# Patient Record
Sex: MALE | Race: NATIVE HAWAIIAN OR OTHER PACIFIC ISLANDER | HISPANIC OR LATINO | Employment: UNEMPLOYED | URBAN - METROPOLITAN AREA
[De-identification: names, ages, dates, MRNs, and addresses within clinical notes are randomized per-mention and may not be internally consistent; named-entity substitution may affect disease eponyms.]

---

## 2024-05-04 ENCOUNTER — APPOINTMENT (OUTPATIENT)
Dept: GENERAL RADIOLOGY | Facility: CLINIC | Age: 29
End: 2024-05-04
Attending: EMERGENCY MEDICINE

## 2024-05-04 ENCOUNTER — HOSPITAL ENCOUNTER (EMERGENCY)
Facility: CLINIC | Age: 29
Discharge: HOME | End: 2024-05-04
Attending: EMERGENCY MEDICINE | Admitting: EMERGENCY MEDICINE

## 2024-05-04 VITALS
HEART RATE: 62 BPM | DIASTOLIC BLOOD PRESSURE: 74 MMHG | TEMPERATURE: 98.4 F | RESPIRATION RATE: 18 BRPM | SYSTOLIC BLOOD PRESSURE: 114 MMHG | OXYGEN SATURATION: 95 %

## 2024-05-04 DIAGNOSIS — S86.011A ACHILLES TENDON TEAR, RIGHT, INITIAL ENCOUNTER: Primary | ICD-10-CM

## 2024-05-04 PROCEDURE — 99284 EMERGENCY DEPT VISIT MOD MDM: CPT | Mod: 25 | Performed by: EMERGENCY MEDICINE

## 2024-05-04 PROCEDURE — 29515 APPLICATION SHORT LEG SPLINT: CPT | Mod: RT | Performed by: EMERGENCY MEDICINE

## 2024-05-04 PROCEDURE — 73610 X-RAY EXAM OF ANKLE: CPT | Mod: RT

## 2024-05-04 PROCEDURE — 99284 EMERGENCY DEPT VISIT MOD MDM: CPT | Performed by: EMERGENCY MEDICINE

## 2024-05-04 RX ORDER — PROPARACAINE HYDROCHLORIDE 5 MG/ML
1 SOLUTION/ DROPS OPHTHALMIC ONCE
Status: COMPLETED | OUTPATIENT
Start: 2024-05-04 | End: 2024-05-04

## 2024-05-04 RX ORDER — BACITRACIN ZINC 500 [USP'U]/G
OINTMENT TOPICAL DAILY
Status: DISCONTINUED | OUTPATIENT
Start: 2024-05-04 | End: 2024-05-04

## 2024-05-04 RX ORDER — BACITRACIN ZINC 500 [USP'U]/G
OINTMENT TOPICAL 2 TIMES DAILY
Qty: 30 G | Refills: 0 | Status: SHIPPED | OUTPATIENT
Start: 2024-05-04 | End: 2024-05-11

## 2024-05-04 RX ORDER — BACITRACIN ZINC 500 [USP'U]/G
OINTMENT TOPICAL ONCE
Status: COMPLETED | OUTPATIENT
Start: 2024-05-04 | End: 2024-05-04

## 2024-05-04 ASSESSMENT — ACTIVITIES OF DAILY LIVING (ADL)
ADLS_ACUITY_SCORE: 35

## 2024-05-04 ASSESSMENT — COLUMBIA-SUICIDE SEVERITY RATING SCALE - C-SSRS
6. HAVE YOU EVER DONE ANYTHING, STARTED TO DO ANYTHING, OR PREPARED TO DO ANYTHING TO END YOUR LIFE?: NO
2. HAVE YOU ACTUALLY HAD ANY THOUGHTS OF KILLING YOURSELF IN THE PAST MONTH?: NO
1. IN THE PAST MONTH, HAVE YOU WISHED YOU WERE DEAD OR WISHED YOU COULD GO TO SLEEP AND NOT WAKE UP?: NO
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
## 2024-05-04 NOTE — ED TRIAGE NOTES
Triage Assessment (Adult)       Row Name 05/04/24 0112          Triage Assessment    Airway WDL WDL        Respiratory WDL    Respiratory WDL WDL        Skin Circulation/Temperature WDL    Skin Circulation/Temperature WDL WDL        Cardiac WDL    Cardiac WDL WDL        Peripheral/Neurovascular WDL    Peripheral Neurovascular WDL WDL        Cognitive/Neuro/Behavioral WDL    Cognitive/Neuro/Behavioral WDL WDL
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
## 2024-05-10 NOTE — PROGRESS NOTES
Dionicio Houston  :  1995  DOS: 5/10/2024  MRN: [id number]  PCP: No Ref-Primary, Physician    Sports Medicine Clinic Visit    HPI  Dionicio Houston is a 28 year old male who is seen as an ER referral presenting with possible right partial achilles tear.    - Mechanism of Injury:    -  Jumping over a ledge and landing on it.  [place] like he sprained the right ankle.  Difficulty bearing weight after and most pain when the foot was flat.  - Pertinent history and prior evaluations:    - was seen in the ED on 24 .  A defect was felt in the right Achilles tendon with a positive Johnson's test and tenderness.  X-ray showed no acute fracture or dislocation, but did identify a possible intraosseous lipoma in the calcaneus.   - Placed in walking boot and made nonweightbearing with crutches.  Orthopedic follow-up    - Pain Character:    - Location:  Right achilles  - Character:  Pain sometimes  - Duration:  May 4th  - Course:  Wearing the boot since the emergency room  - Endorses:    -  Pain and swelling  - Denies:    - numbness, tingling  - Alleviating factors:    -  Wearing the boot full time    - Aggravating factors:    - weight bearing, walking out of the boot  - Other treatments tried:    -  nothing    - Patient Goals:    - discuss treatment options  - Social History:   - Employed:  [employer]       Review of Systems  Musculoskeletal: as above  Remainder of review of systems is negative including constitutional, CV, pulmonary, GI, Skin and Neurologic except as noted in HPI or medical history.    No past medical history on file.  No past surgical history on file.  No family history on file.      Objective  There were no vitals taken for this visit.    General: healthy, alert and in no acute distress.    HEENT: no scleral icterus or conjunctival erythema.   Skin: no suspicious lesions or rash. No jaundice.   CV: regular rhythm by palpation, 2+ distal pulses.  Resp: normal respiratory effort  without conversational dyspnea.   Psych: normal mood and affect.    Gait: antalgic favoring the right leg, walking boot in place with appropriate coordination and balance.    Neuro:       - Sensation to light touch:  Intact throughout the BLE including all peripheral nerve distributions.     MSK - Ankle/Foot:       - Inspection/palpation:    -There is an obvious defect to the right Achilles compared to the left.  Palpable gap in the Achilles tendon no significant swelling, erythema, ecchymosis, lesion.        - ROM:    - Full and painless AROM/PROM.        - Strength:  (*antalgic)   - Knee Flexion  5   - Knee Extension 5   - Dorsiflexion  5   - Plantarflexion 2        - Special tests:        - Anterior drawer:  Neg   - Talar tilt:  Neg   - Syndesmotic squeeze: Neg   - Kleiger:  Neg   - Rebekah:  Neg   - Johnson: Positive   - Metatarsal squeeze:  Neg       - Functional tests:        - Heel raises: Unable to perform a single wrap due to weakness, not pain      Radiology  I independently reviewed the available relevant imaging in the chart, with my interpretations as above in HPI.    I independently reviewed today's new relevant imaging, with the following interpretation:  - Limited point-of-care ultrasound demonstrated near complete tearing of the Achilles tendon with associated gapped fibers and surrounding fluid.      Assessment  1. Achilles tendon tear, right, initial encounter        Plan  Dionicio Houston is a pleasant 28 year old male that presents with acute right lower leg pain after jumping injury on 5/4/2024.  He presents with a walking boot today as he was clinically suspected to have an acute Achilles tendon rupture at a previous evaluation and recommended for orthopedic follow-up for further treatment options and recommendations.  He is unable to perform a heel raise, has plantarflexion weakness, and has a positive Johnson test today with sonographic evidence of a near complete Achilles tendon  tear.    We discussed the nature of the condition and available treatment options including nonoperative and operative treatments for the Achilles.  Also offered an MRI for definitive diagnosis and potential surgical planning.  Also offered physical therapy for nonoperative treatment, which has been shown to provide similar benefits to surgery beyond about 1 year after injury.  Unfortunately, he has just moved to this country and would have difficulty paying for surgery or the MRI.  At this time, he is most in favor of participating in physical therapy.  I placed orders for the MRI and provided a price check number for a cost estimate.  Also placed a referral for physical therapy and instructions given for calling to schedule his sessions.  Recommended wearing the walking boot consistently when upright for the next several weeks, weightbearing as tolerated.  He may follow-up in about 6 to 8 weeks as needed, or sooner for new/worsening symptoms.    Perico Wooten DO, KIERA  [facility] Sports [facility]  [facility] - Department of Orthopedic Surgery       Disclaimer:  This note was prepared and written using Dragon Medical dictation software. As a result, there may be errors in the script that have gone undetected. Please consider this when interpreting the information in this note.

## 2024-05-11 ENCOUNTER — OFFICE VISIT (OUTPATIENT)
Dept: ORTHOPEDICS | Facility: CLINIC | Age: 29
End: 2024-05-11
Attending: EMERGENCY MEDICINE

## 2024-05-11 DIAGNOSIS — S86.011A ACHILLES TENDON TEAR, RIGHT, INITIAL ENCOUNTER: Primary | ICD-10-CM

## 2024-05-11 PROCEDURE — 99203 OFFICE O/P NEW LOW 30 MIN: CPT | Performed by: STUDENT IN AN ORGANIZED HEALTH CARE EDUCATION/TRAINING PROGRAM

## 2024-05-11 NOTE — PATIENT INSTRUCTIONS
For questions about the cost of your visit, or the cost of any procedure, lab or imaging study, please contact our CONSUMER PRICE LINE at [phone] for an estimate.  You may also contact them at [email or web address]     You will be asked to provide your name, birthdate, address, phone number, and insurance information.  You will also need to know the name of any specific test or procedure which is planned.  This often requires the CPT (common procedural terminology) code for the test or procedure.  Our clinic staff can help you get that information, if needed.    For information about how your insurance will cover your clinic visit, please call the customer service number on your insurance card.    ---------------------    - Physical Therapy referral placed today. Please call [phone] to schedule appointments.     -------------------      MRI Scheduling Instructions  Please follow both steps below    1.  Advanced imaging is done by appointment. Please call Central Imaging ([facility]/[place]) [phone] to schedule your MRI at your earliest convenience.   - Some insurance companies may require a prior authorization to be completed which can delay the time until you are able to schedule your appointment.     - If you are active on [employer], you may have access to your test results before your provider is able to review the study and advise on next steps.      2. After the date of your MRI has been scheduled, please call [phone] to get on my schedule for an in-person or telephone follow up appointment to discuss the results and updated treatment recommendations. This follow up should be scheduled for 1-2 days after the date of your MRI.

## 2024-05-11 NOTE — LETTER
2024         RE: Dionicio Houston  [address] [facility] [address]        Dear Colleague,    Thank you for referring your patient, Dionicio Houston, to the [facility] [place]. Please see a copy of my visit note below.    Dionicio Houston  :  1995  DOS: 5/10/2024  MRN: [id number]  PCP: No Ref-Primary, Physician    Sports Medicine Clinic Visit    HPI  Dionicio Houston is a 28 year old male who is seen as an ER referral presenting with possible right partial achilles tear.    - Mechanism of Injury:    -  Jumping over a ledge and landing on it.  [place] like he sprained the right ankle.  Difficulty bearing weight after and most pain when the foot was flat.  - Pertinent history and prior evaluations:    - was seen in the ED on 24 .  A defect was felt in the right Achilles tendon with a positive Johnson's test and tenderness.  X-ray showed no acute fracture or dislocation, but did identify a possible intraosseous lipoma in the calcaneus.   - Placed in walking boot and made nonweightbearing with crutches.  Orthopedic follow-up    - Pain Character:    - Location:  Right achilles  - Character:  Pain sometimes  - Duration:  May 4th  - Course:  Wearing the boot since the emergency room  - Endorses:    -  Pain and swelling  - Denies:    - numbness, tingling  - Alleviating factors:    -  Wearing the boot full time    - Aggravating factors:    - weight bearing, walking out of the boot  - Other treatments tried:    -  nothing    - Patient Goals:    - discuss treatment options  - Social History:   - Employed:  [employer]       Review of Systems  Musculoskeletal: as above  Remainder of review of systems is negative including constitutional, CV, pulmonary, GI, Skin and Neurologic except as noted in HPI or medical history.    No past medical history on file.  No past surgical history on file.  No family history on  file.      Objective  There were no vitals taken for this visit.    General: healthy, alert and in no acute distress.    HEENT: no scleral icterus or conjunctival erythema.   Skin: no suspicious lesions or rash. No jaundice.   CV: regular rhythm by palpation, 2+ distal pulses.  Resp: normal respiratory effort without conversational dyspnea.   Psych: normal mood and affect.    Gait: antalgic favoring the right leg, walking boot in place with appropriate coordination and balance.    Neuro:       - Sensation to light touch:  Intact throughout the BLE including all peripheral nerve distributions.     MSK - Ankle/Foot:       - Inspection/palpation:    -There is an obvious defect to the right Achilles compared to the left.  Palpable gap in the Achilles tendon no significant swelling, erythema, ecchymosis, lesion.        - ROM:    - Full and painless AROM/PROM.        - Strength:  (*antalgic)   - Knee Flexion  5   - Knee Extension 5   - Dorsiflexion  5   - Plantarflexion 2        - Special tests:        - Anterior drawer:  Neg   - Talar tilt:  Neg   - Syndesmotic squeeze: Neg   - Kleiger:  Neg   - Rebekah:  Neg   - Johnson: Positive   - Metatarsal squeeze:  Neg       - Functional tests:        - Heel raises: Unable to perform a single wrap due to weakness, not pain      Radiology  I independently reviewed the available relevant imaging in the chart, with my interpretations as above in HPI.    I independently reviewed today's new relevant imaging, with the following interpretation:  - Limited point-of-care ultrasound demonstrated near complete tearing of the Achilles tendon with associated gapped fibers and surrounding fluid.      Assessment  1. Achilles tendon tear, right, initial encounter        Plan  Dionicio Houston is a pleasant 28 year old male that presents with acute right lower leg pain after jumping injury on 5/4/2024.  He presents with a walking boot today as he was clinically suspected to have an acute  Achilles tendon rupture at a previous evaluation and recommended for orthopedic follow-up for further treatment options and recommendations.  He is unable to perform a heel raise, has plantarflexion weakness, and has a positive Johnson test today with sonographic evidence of a near complete Achilles tendon tear.    We discussed the nature of the condition and available treatment options including nonoperative and operative treatments for the Achilles.  Also offered an MRI for definitive diagnosis and potential surgical planning.  Also offered physical therapy for nonoperative treatment, which has been shown to provide similar benefits to surgery beyond about 1 year after injury.  Unfortunately, he has just moved to this country and would have difficulty paying for surgery or the MRI.  At this time, he is most in favor of participating in physical therapy.  I placed orders for the MRI and provided a price check number for a cost estimate.  Also placed a referral for physical therapy and instructions given for calling to schedule his sessions.  Recommended wearing the walking boot consistently when upright for the next several weeks, weightbearing as tolerated.  He may follow-up in about 6 to 8 weeks as needed, or sooner for new/worsening symptoms.    Perico Wooten DO, CAQSM  [facility] Sports Medicine  [facility] - Department of Orthopedic Surgery       Disclaimer:  This note was prepared and written using Dragon Medical dictation software. As a result, there may be errors in the script that have gone undetected. Please consider this when interpreting the information in this note.          Again, thank you for allowing me to participate in the care of your patient.        Sincerely,        Perico Wooten DO